# Patient Record
Sex: MALE | Race: WHITE | NOT HISPANIC OR LATINO | Employment: OTHER | RURAL
[De-identification: names, ages, dates, MRNs, and addresses within clinical notes are randomized per-mention and may not be internally consistent; named-entity substitution may affect disease eponyms.]

---

## 2021-09-01 ENCOUNTER — HOSPITAL ENCOUNTER (OUTPATIENT)
Dept: RADIOLOGY | Facility: HOSPITAL | Age: 81
Discharge: HOME | End: 2021-09-01
Attending: ORTHOPAEDIC SURGERY
Payer: MEDICARE

## 2021-09-01 DIAGNOSIS — M25.561 PAIN IN BOTH KNEES, UNSPECIFIED CHRONICITY: ICD-10-CM

## 2021-09-01 DIAGNOSIS — M25.562 PAIN IN BOTH KNEES, UNSPECIFIED CHRONICITY: ICD-10-CM

## 2021-09-01 PROBLEM — M70.62 TROCHANTERIC BURSITIS OF LEFT HIP: Status: ACTIVE | Noted: 2021-09-01

## 2021-09-01 PROBLEM — M54.16 LUMBAR RADICULAR PAIN: Status: ACTIVE | Noted: 2021-09-01

## 2021-09-01 PROCEDURE — 73564 X-RAY EXAM KNEE 4 OR MORE: CPT | Mod: TC,50

## 2022-09-12 ENCOUNTER — OFFICE VISIT (OUTPATIENT)
Dept: NEUROLOGY | Facility: CLINIC | Age: 82
End: 2022-09-12
Payer: MEDICARE

## 2022-09-12 VITALS
OXYGEN SATURATION: 97 % | SYSTOLIC BLOOD PRESSURE: 140 MMHG | DIASTOLIC BLOOD PRESSURE: 78 MMHG | BODY MASS INDEX: 23.23 KG/M2 | HEART RATE: 112 BPM | HEIGHT: 67 IN | WEIGHT: 148 LBS

## 2022-09-12 DIAGNOSIS — G31.84 MCI (MILD COGNITIVE IMPAIRMENT): Primary | ICD-10-CM

## 2022-09-12 PROCEDURE — 99204 PR OFFICE/OUTPT VISIT, NEW, LEVL IV, 45-59 MIN: ICD-10-PCS | Mod: S$PBB,,, | Performed by: PSYCHIATRY & NEUROLOGY

## 2022-09-12 PROCEDURE — 99204 OFFICE O/P NEW MOD 45 MIN: CPT | Mod: S$PBB,,, | Performed by: PSYCHIATRY & NEUROLOGY

## 2022-09-12 PROCEDURE — 99214 OFFICE O/P EST MOD 30 MIN: CPT | Mod: PBBFAC | Performed by: PSYCHIATRY & NEUROLOGY

## 2022-09-12 RX ORDER — SERTRALINE HYDROCHLORIDE 50 MG/1
50 TABLET, FILM COATED ORAL DAILY
COMMUNITY

## 2022-09-12 NOTE — PATIENT INSTRUCTIONS
Cont current meds   Excellent family support network   Cont Zoloft and Remeron as working well   Physical activity as tolerated   F/u 6 months

## 2022-09-12 NOTE — PROGRESS NOTES
Subjective:       Patient ID: Cliff Freedman is a 82 y.o. male     Chief Complaint:    Chief Complaint   Patient presents with    Memory Loss        Allergies:  Patient has no known allergies.    Current Medications:    Outpatient Encounter Medications as of 9/12/2022   Medication Sig Dispense Refill    aspirin (ECOTRIN) 81 MG EC tablet Take 81 mg by mouth once daily.      bisoprolol (ZEBETA) 5 MG tablet       CHOLESTYRAMINE LIGHT 4 gram Powd       dicyclomine (BENTYL) 10 MG capsule       ELIQUIS 2.5 mg Tab       eszopiclone (LUNESTA) 3 mg Tab       furosemide (LASIX) 40 MG tablet       mirtazapine (REMERON) 30 MG tablet       nitroGLYCERIN (NITROSTAT) 0.4 MG SL tablet Place 0.4 mg under the tongue every 5 (five) minutes as needed for Chest pain.      omeprazole (PRILOSEC) 40 MG capsule       potassium chloride (KLOR-CON) 10 MEQ TbSR       potassium citrate (UROCIT-K) 5 mEq (540 mg) TbSR       rosuvastatin (CRESTOR) 5 MG tablet       spironolactone (ALDACTONE) 25 MG tablet       sertraline (ZOLOFT) 50 MG tablet Take 50 mg by mouth once daily.       No facility-administered encounter medications on file as of 9/12/2022.       History of Present Illness  83 yo WM for eval of memory issues exacerbated over last two yrs since death of wife due to COVID- lead to incr depression w/ loss of interest in pleasurable activity, crying spells, poor sleep and incr forgetfulness, etc  Symptoms c/w normal age-related conditions of memory impairment- mild dementia vs depression from grief /bereavement or more likely combo of both  Per prev [facility] records admin of antidepressant Zoloft improved all the above dramatically            Past Medical History:   Diagnosis Date    Diabetes mellitus, type 2     Heart disease     Hyperlipidemia     Hypertension     Osteoarthritis        Past Surgical History:   Procedure Laterality Date    APPENDECTOMY      CARDIAC SURGERY      CAROTID ENDARTERECTOMY      CATARACT EXTRACTION      JOINT  "REPLACEMENT Bilateral     Bilateral hips    LITHOTRIPSY      TONSILLECTOMY         Social History  Mr. Freedman  reports that he has quit smoking. He uses smokeless tobacco. He reports that he does not drink alcohol and does not use drugs.    Family History  Mr.'s Freedman family history includes Cancer in his father.    Review of Systems  Review of Systems   Psychiatric/Behavioral:  Positive for depression and memory loss. The patient is nervous/anxious.    All other systems reviewed and are negative.   Objective:   BP (!) 140/78 (BP Location: Left arm, Patient Position: Sitting, BP Method: Large (Automatic))   Pulse (!) 112   Ht 5' 7" (1.702 m)   Wt 67.1 kg (148 lb)   SpO2 97%   BMI 23.18 kg/m²    NEUROLOGICAL EXAMINATION:     MENTAL STATUS   Oriented to person, place, and time.   Oriented to person.   Oriented to place. Oriented to country and city.   Disoriented to month. Oriented to year.   Level of consciousness: alert  Knowledge: good.        MILD COGNITIVE IMPAIRMENT      CRANIAL NERVES   Cranial nerves II through XII intact.        Residual R eye vision loss from stroke     MOTOR EXAM   Muscle bulk: normal  Overall muscle tone: normal    Strength   Strength 5/5 throughout.      Physical Exam  Vitals reviewed.   Neurological:      General: No focal deficit present.      Mental Status: He is alert and oriented to person, place, and time. Mental status is at baseline.      Cranial Nerves: Cranial nerves 2-12 are intact.      Motor: Motor strength is normal.        Assessment:     MCI (mild cognitive impairment)  Comments:  Pt handling ADL's currently quite well   much improved memory since on Zoloft thus sx likely secodary to grief/bereavement       Primary Diagnosis and ICD10  MCI (mild cognitive impairment) [G31.84]    Plan:     Patient Instructions   Cont current meds   Excellent family support network   Cont Zoloft and Remeron as working well   Physical activity as tolerated   F/u 6 months       There " are no discontinued medications.    Requested Prescriptions      No prescriptions requested or ordered in this encounter

## 2022-09-21 ENCOUNTER — TELEPHONE (OUTPATIENT)
Dept: PULMONOLOGY | Facility: CLINIC | Age: 82
End: 2022-09-21
Payer: MEDICARE

## 2022-09-21 NOTE — TELEPHONE ENCOUNTER
phoned this AM and requested advice.    He reported several day Hx of hip pain:   pain 7-8 on 1-10 pain scale.  When questioned he confirmed that he was trying to reach , not .     was told that best advice for treatment and or assessment would be to call to [facility],  office,   that his chart reflects recent notes from that office.    ( did follow with  in the past but no notes from this clinic 2021 or 2022)/.  was reminded that  manages Pulmonaruy issues,  Not ortho or pain issues.    Chart shows recent visit with , Neurology:  And 2021 visit with  for knee pain:  But more notes from [facility] and [facility].     was encouraged to call again should he not make contact with [facility] team.